# Patient Record
Sex: FEMALE | Race: BLACK OR AFRICAN AMERICAN | NOT HISPANIC OR LATINO | Employment: UNEMPLOYED | URBAN - METROPOLITAN AREA
[De-identification: names, ages, dates, MRNs, and addresses within clinical notes are randomized per-mention and may not be internally consistent; named-entity substitution may affect disease eponyms.]

---

## 2018-09-04 ENCOUNTER — HOSPITAL ENCOUNTER (EMERGENCY)
Facility: HOSPITAL | Age: 53
Discharge: HOME | End: 2018-09-04
Attending: EMERGENCY MEDICINE
Payer: MEDICARE

## 2018-09-04 VITALS
WEIGHT: 160 LBS | RESPIRATION RATE: 18 BRPM | HEIGHT: 64 IN | SYSTOLIC BLOOD PRESSURE: 106 MMHG | HEART RATE: 75 BPM | DIASTOLIC BLOOD PRESSURE: 72 MMHG | TEMPERATURE: 98 F | OXYGEN SATURATION: 99 % | BODY MASS INDEX: 27.31 KG/M2

## 2018-09-04 DIAGNOSIS — M54.2 NECK PAIN: ICD-10-CM

## 2018-09-04 DIAGNOSIS — M43.6 TORTICOLLIS: Primary | ICD-10-CM

## 2018-09-04 PROCEDURE — 99284 EMERGENCY DEPT VISIT MOD MDM: CPT | Mod: 25

## 2018-09-04 PROCEDURE — 25000003 PHARM REV CODE 250: Performed by: EMERGENCY MEDICINE

## 2018-09-04 RX ORDER — CYCLOBENZAPRINE HCL 10 MG
10 TABLET ORAL
Status: COMPLETED | OUTPATIENT
Start: 2018-09-04 | End: 2018-09-04

## 2018-09-04 RX ORDER — IBUPROFEN 600 MG/1
600 TABLET ORAL
Status: COMPLETED | OUTPATIENT
Start: 2018-09-04 | End: 2018-09-04

## 2018-09-04 RX ORDER — NAPROXEN 500 MG/1
500 TABLET ORAL 2 TIMES DAILY WITH MEALS
Qty: 60 TABLET | Refills: 0 | Status: ON HOLD | OUTPATIENT
Start: 2018-09-04 | End: 2024-01-05 | Stop reason: HOSPADM

## 2018-09-04 RX ORDER — CYCLOBENZAPRINE HCL 10 MG
10 TABLET ORAL 3 TIMES DAILY PRN
Qty: 15 TABLET | Refills: 0 | Status: SHIPPED | OUTPATIENT
Start: 2018-09-04 | End: 2018-09-09

## 2018-09-04 RX ADMIN — IBUPROFEN 600 MG: 600 TABLET ORAL at 08:09

## 2018-09-04 RX ADMIN — CYCLOBENZAPRINE HYDROCHLORIDE 10 MG: 10 TABLET, FILM COATED ORAL at 08:09

## 2018-09-05 NOTE — ED PROVIDER NOTES
"Encounter Date: 9/4/2018       History     Chief Complaint   Patient presents with    Neck Pain     Pt reports neck pain x 2 weeks after "sleeping wrong"     Denies trauma, left neck pain x 2-3 weeks, no arm radiation. Mild HA also, NOT worst HA of life.      The history is provided by a parent and the EMS personnel.   Neck Pain    This is a recurrent problem. The current episode started several weeks ago. The problem occurs constantly. The problem has been unchanged. The pain is associated with an unknown factor. The pain is present in the left side. The quality of the pain is described as aching and cramping. The pain does not radiate. The symptoms are aggravated by position. The pain is the same all the time. Associated symptoms include headaches. Pertinent negatives include no numbness, no bowel incontinence, no bladder incontinence, no paresis and no tingling. She has tried NSAIDs (alcohol) for the symptoms. The treatment provided mild relief.     Review of patient's allergies indicates:  No Known Allergies  History reviewed. No pertinent past medical history.  History reviewed. No pertinent surgical history.  History reviewed. No pertinent family history.  Social History     Tobacco Use    Smoking status: Current Every Day Smoker     Packs/day: 0.50     Types: Cigarettes   Substance Use Topics    Alcohol use: Yes     Comment: beer    Drug use: No     Review of Systems   Constitutional: Negative.    HENT: Negative.    Respiratory: Negative.    Cardiovascular: Negative.    Gastrointestinal: Negative for bowel incontinence.   Genitourinary: Negative for bladder incontinence.   Musculoskeletal: Positive for neck pain.   Neurological: Positive for headaches. Negative for dizziness, tingling, seizures, facial asymmetry, speech difficulty and numbness.   All other systems reviewed and are negative.      Physical Exam     Initial Vitals [09/04/18 1918]   BP Pulse Resp Temp SpO2   137/73 81 18 97.9 °F (36.6 °C) 97 " %      MAP       --         Physical Exam    Nursing note and vitals reviewed.  Constitutional: She appears well-developed and well-nourished.   HENT:   Head: Normocephalic.   Eyes: Conjunctivae and EOM are normal. Pupils are equal, round, and reactive to light.   Neck: Trachea normal and phonation normal. Neck supple. Muscular tenderness present. No spinous process tenderness present. Decreased range of motion present. No tracheal deviation and no erythema present. No neck rigidity. No Brudzinski's sign and no Kernig's sign noted.   Neurological: She is alert and oriented to person, place, and time.   Skin: Skin is warm and dry. Capillary refill takes less than 2 seconds.         ED Course   Procedures  Labs Reviewed - No data to display       Imaging Results          X-Ray Cervical Spine AP And Lateral (Final result)  Result time 09/04/18 21:15:59    Final result by Neo Chawla MD (09/04/18 21:15:59)                 Impression:      1. Markedly limited evaluation, particularly involving the upper cervical segments.  No definite malalignment however fracture cannot be excluded given exam quality.  Correlation is advised.      Electronically signed by: Neo Chawla MD  Date:    09/04/2018  Time:    21:15             Narrative:    EXAMINATION:  XR CERVICAL SPINE AP LATERAL    CLINICAL HISTORY:  Cervicalgia    TECHNIQUE:  AP, lateral and open mouth views of the cervical spine were performed.    COMPARISON:  None.    FINDINGS:  Six views.    Please note, positioning is suboptimal, and markedly limits evaluation of the cervical spine.  Patient's arms obscure view of the upper cervical spine, particularly on the lateral views.    Allowing for the above, lateral views demonstrates grossly adequate alignment of the cervical spine.  There is disc space height loss involving the upper cervical segments.  The facet joints appear grossly aligned.  AP alignment appears preserved.  The odontoid is unable to be  appropriately evaluated, however appears intact on AP view..  The upper lung zones are grossly clear.  The paraspinous and hypopharyngeal soft tissues are grossly unremarkable.  The airway is patent.                                 Medical Decision Making:   Initial Assessment:   Non-traumatic left neck pain, xray c-spine, flexeril, motrin, reassess                   ED Course as of Sep 04 2317   Tue Sep 04, 2018   2058 Stable in NAD, xrays neg for fx, muscle spams, d/c home flexeril and naprosyn  [PP]      ED Course User Index  [PP] Jerel Bell MD     Clinical Impression:   The primary encounter diagnosis was Torticollis. A diagnosis of Neck pain was also pertinent to this visit.                             Jerel Bell MD  09/04/18 0753

## 2018-09-05 NOTE — ED TRIAGE NOTES
"Left sided neck pain since Saturday. Pt states "I can't seep or nothing." pt AAOX 3 at this lopez.e pt holding neck. Pt in no distress.  "

## 2024-01-03 ENCOUNTER — HOSPITAL ENCOUNTER (INPATIENT)
Facility: HOSPITAL | Age: 59
LOS: 1 days | Discharge: HOME | DRG: 641 | End: 2024-01-05
Attending: EMERGENCY MEDICINE | Admitting: INTERNAL MEDICINE
Payer: MEDICARE

## 2024-01-03 DIAGNOSIS — R00.0 TACHYCARDIA: ICD-10-CM

## 2024-01-03 DIAGNOSIS — R07.9 CHEST PAIN: ICD-10-CM

## 2024-01-03 DIAGNOSIS — E86.0 DEHYDRATION: ICD-10-CM

## 2024-01-03 DIAGNOSIS — R19.7 DIARRHEA, UNSPECIFIED TYPE: ICD-10-CM

## 2024-01-03 DIAGNOSIS — J11.1 INFLUENZA: Primary | ICD-10-CM

## 2024-01-03 DIAGNOSIS — B20 HIV DISEASE: ICD-10-CM

## 2024-01-03 DIAGNOSIS — E83.42 HYPOMAGNESEMIA: ICD-10-CM

## 2024-01-03 DIAGNOSIS — E83.51 HYPOCALCEMIA: ICD-10-CM

## 2024-01-03 DIAGNOSIS — E87.6 HYPOKALEMIA: ICD-10-CM

## 2024-01-03 LAB
ALBUMIN SERPL BCP-MCNC: 1.6 G/DL (ref 3.5–5.2)
ALP SERPL-CCNC: 98 U/L (ref 55–135)
ALT SERPL W/O P-5'-P-CCNC: 48 U/L (ref 10–44)
ANION GAP SERPL CALC-SCNC: 6 MMOL/L (ref 8–16)
AST SERPL-CCNC: 47 U/L (ref 10–40)
BASOPHILS # BLD AUTO: 0.02 K/UL (ref 0–0.2)
BASOPHILS NFR BLD: 0.3 % (ref 0–1.9)
BILIRUB SERPL-MCNC: 0.5 MG/DL (ref 0.1–1)
BUN SERPL-MCNC: 3 MG/DL (ref 6–20)
CALCIUM SERPL-MCNC: 6 MG/DL (ref 8.7–10.5)
CHLORIDE SERPL-SCNC: 111 MMOL/L (ref 95–110)
CO2 SERPL-SCNC: 18 MMOL/L (ref 23–29)
CREAT SERPL-MCNC: 0.5 MG/DL (ref 0.5–1.4)
DIFFERENTIAL METHOD BLD: ABNORMAL
EOSINOPHIL # BLD AUTO: 0 K/UL (ref 0–0.5)
EOSINOPHIL NFR BLD: 0 % (ref 0–8)
ERYTHROCYTE [DISTWIDTH] IN BLOOD BY AUTOMATED COUNT: 13.4 % (ref 11.5–14.5)
EST. GFR  (NO RACE VARIABLE): >60 ML/MIN/1.73 M^2
GLUCOSE SERPL-MCNC: 75 MG/DL (ref 70–110)
HCT VFR BLD AUTO: 30 % (ref 37–48.5)
HGB BLD-MCNC: 10.2 G/DL (ref 12–16)
IMM GRANULOCYTES # BLD AUTO: 0.06 K/UL (ref 0–0.04)
IMM GRANULOCYTES NFR BLD AUTO: 1 % (ref 0–0.5)
LACTATE SERPL-SCNC: 2 MMOL/L (ref 0.5–2.2)
LYMPHOCYTES # BLD AUTO: 1.5 K/UL (ref 1–4.8)
LYMPHOCYTES NFR BLD: 23.8 % (ref 18–48)
MAGNESIUM SERPL-MCNC: 1 MG/DL (ref 1.6–2.6)
MCH RBC QN AUTO: 28.4 PG (ref 27–31)
MCHC RBC AUTO-ENTMCNC: 34 G/DL (ref 32–36)
MCV RBC AUTO: 84 FL (ref 82–98)
MONOCYTES # BLD AUTO: 0.3 K/UL (ref 0.3–1)
MONOCYTES NFR BLD: 5 % (ref 4–15)
NEUTROPHILS # BLD AUTO: 4.4 K/UL (ref 1.8–7.7)
NEUTROPHILS NFR BLD: 69.9 % (ref 38–73)
NRBC BLD-RTO: 0 /100 WBC
PHOSPHATE SERPL-MCNC: 2.2 MG/DL (ref 2.7–4.5)
PLATELET # BLD AUTO: 156 K/UL (ref 150–450)
PMV BLD AUTO: 10.1 FL (ref 9.2–12.9)
POTASSIUM SERPL-SCNC: 2.4 MMOL/L (ref 3.5–5.1)
PROT SERPL-MCNC: 5.3 G/DL (ref 6–8.4)
RBC # BLD AUTO: 3.59 M/UL (ref 4–5.4)
SODIUM SERPL-SCNC: 135 MMOL/L (ref 136–145)
TSH SERPL DL<=0.005 MIU/L-ACNC: 0.82 UIU/ML (ref 0.4–4)
WBC # BLD AUTO: 6.22 K/UL (ref 3.9–12.7)

## 2024-01-03 PROCEDURE — 84100 ASSAY OF PHOSPHORUS: CPT | Performed by: EMERGENCY MEDICINE

## 2024-01-03 PROCEDURE — 93005 ELECTROCARDIOGRAM TRACING: CPT

## 2024-01-03 PROCEDURE — 99291 CRITICAL CARE FIRST HOUR: CPT

## 2024-01-03 PROCEDURE — 80053 COMPREHEN METABOLIC PANEL: CPT | Performed by: EMERGENCY MEDICINE

## 2024-01-03 PROCEDURE — 83735 ASSAY OF MAGNESIUM: CPT | Performed by: EMERGENCY MEDICINE

## 2024-01-03 PROCEDURE — 85025 COMPLETE CBC W/AUTO DIFF WBC: CPT | Performed by: EMERGENCY MEDICINE

## 2024-01-03 PROCEDURE — 96366 THER/PROPH/DIAG IV INF ADDON: CPT

## 2024-01-03 PROCEDURE — 25000003 PHARM REV CODE 250: Performed by: EMERGENCY MEDICINE

## 2024-01-03 PROCEDURE — 84443 ASSAY THYROID STIM HORMONE: CPT | Performed by: EMERGENCY MEDICINE

## 2024-01-03 PROCEDURE — 96361 HYDRATE IV INFUSION ADD-ON: CPT

## 2024-01-03 PROCEDURE — 96375 TX/PRO/DX INJ NEW DRUG ADDON: CPT

## 2024-01-03 PROCEDURE — 96365 THER/PROPH/DIAG IV INF INIT: CPT | Mod: 59

## 2024-01-03 PROCEDURE — 84145 PROCALCITONIN (PCT): CPT | Performed by: EMERGENCY MEDICINE

## 2024-01-03 PROCEDURE — 63600175 PHARM REV CODE 636 W HCPCS: Performed by: EMERGENCY MEDICINE

## 2024-01-03 PROCEDURE — 83605 ASSAY OF LACTIC ACID: CPT | Performed by: EMERGENCY MEDICINE

## 2024-01-03 PROCEDURE — 93010 ELECTROCARDIOGRAM REPORT: CPT | Mod: ,,, | Performed by: INTERNAL MEDICINE

## 2024-01-03 RX ORDER — MAGNESIUM SULFATE HEPTAHYDRATE 40 MG/ML
2 INJECTION, SOLUTION INTRAVENOUS ONCE
Status: COMPLETED | OUTPATIENT
Start: 2024-01-03 | End: 2024-01-04

## 2024-01-03 RX ORDER — ACETAMINOPHEN 500 MG
1000 TABLET ORAL
Status: COMPLETED | OUTPATIENT
Start: 2024-01-03 | End: 2024-01-03

## 2024-01-03 RX ORDER — CALCIUM GLUCONATE 98 MG/ML
1 INJECTION, SOLUTION INTRAVENOUS
Status: COMPLETED | OUTPATIENT
Start: 2024-01-03 | End: 2024-01-03

## 2024-01-03 RX ADMIN — MAGNESIUM SULFATE HEPTAHYDRATE 2 G: 40 INJECTION, SOLUTION INTRAVENOUS at 11:01

## 2024-01-03 RX ADMIN — SODIUM CHLORIDE, POTASSIUM CHLORIDE, SODIUM LACTATE AND CALCIUM CHLORIDE 1000 ML: 600; 310; 30; 20 INJECTION, SOLUTION INTRAVENOUS at 10:01

## 2024-01-03 RX ADMIN — ACETAMINOPHEN 1000 MG: 500 TABLET ORAL at 10:01

## 2024-01-03 RX ADMIN — CALCIUM GLUCONATE 1 G: 98 INJECTION, SOLUTION INTRAVENOUS at 11:01

## 2024-01-03 RX ADMIN — POTASSIUM BICARBONATE 50 MEQ: 977.5 TABLET, EFFERVESCENT ORAL at 11:01

## 2024-01-04 PROBLEM — E83.42 HYPOMAGNESEMIA: Status: ACTIVE | Noted: 2024-01-04

## 2024-01-04 PROBLEM — B20 HIV DISEASE: Status: ACTIVE | Noted: 2023-12-28

## 2024-01-04 PROBLEM — E87.6 HYPOKALEMIA: Status: ACTIVE | Noted: 2024-01-04

## 2024-01-04 PROBLEM — E83.51 HYPOCALCEMIA: Status: ACTIVE | Noted: 2024-01-04

## 2024-01-04 PROBLEM — J11.1 INFLUENZA: Status: ACTIVE | Noted: 2024-01-03

## 2024-01-04 LAB
ALBUMIN SERPL BCP-MCNC: 2.2 G/DL (ref 3.5–5.2)
ALP SERPL-CCNC: 138 U/L (ref 55–135)
ALT SERPL W/O P-5'-P-CCNC: 57 U/L (ref 10–44)
ANION GAP SERPL CALC-SCNC: 7 MMOL/L (ref 8–16)
AST SERPL-CCNC: 59 U/L (ref 10–40)
BILIRUB SERPL-MCNC: 0.6 MG/DL (ref 0.1–1)
BILIRUB UR QL STRIP: NEGATIVE
BUN SERPL-MCNC: 4 MG/DL (ref 6–20)
CALCIUM SERPL-MCNC: 8.7 MG/DL (ref 8.7–10.5)
CHLORIDE SERPL-SCNC: 99 MMOL/L (ref 95–110)
CLARITY UR: CLEAR
CO2 SERPL-SCNC: 26 MMOL/L (ref 23–29)
COLOR UR: YELLOW
CREAT SERPL-MCNC: 0.6 MG/DL (ref 0.5–1.4)
EST. GFR  (NO RACE VARIABLE): >60 ML/MIN/1.73 M^2
GLUCOSE SERPL-MCNC: 80 MG/DL (ref 70–110)
GLUCOSE UR QL STRIP: NEGATIVE
HGB UR QL STRIP: NEGATIVE
KETONES UR QL STRIP: NEGATIVE
LEUKOCYTE ESTERASE UR QL STRIP: NEGATIVE
MAGNESIUM SERPL-MCNC: 1.9 MG/DL (ref 1.6–2.6)
NITRITE UR QL STRIP: NEGATIVE
PH UR STRIP: 7 [PH] (ref 5–8)
POTASSIUM SERPL-SCNC: 3.7 MMOL/L (ref 3.5–5.1)
PROCALCITONIN SERPL IA-MCNC: 1.29 NG/ML
PROT SERPL-MCNC: 7.4 G/DL (ref 6–8.4)
PROT UR QL STRIP: NEGATIVE
SODIUM SERPL-SCNC: 132 MMOL/L (ref 136–145)
SP GR UR STRIP: 1.01 (ref 1–1.03)
URN SPEC COLLECT METH UR: NORMAL
UROBILINOGEN UR STRIP-ACNC: NEGATIVE EU/DL

## 2024-01-04 PROCEDURE — 36415 COLL VENOUS BLD VENIPUNCTURE: CPT | Performed by: STUDENT IN AN ORGANIZED HEALTH CARE EDUCATION/TRAINING PROGRAM

## 2024-01-04 PROCEDURE — 25000003 PHARM REV CODE 250: Performed by: EMERGENCY MEDICINE

## 2024-01-04 PROCEDURE — 21400001 HC TELEMETRY ROOM

## 2024-01-04 PROCEDURE — 27000207 HC ISOLATION

## 2024-01-04 PROCEDURE — 81003 URINALYSIS AUTO W/O SCOPE: CPT | Performed by: EMERGENCY MEDICINE

## 2024-01-04 PROCEDURE — 86361 T CELL ABSOLUTE COUNT: CPT | Performed by: EMERGENCY MEDICINE

## 2024-01-04 PROCEDURE — 80053 COMPREHEN METABOLIC PANEL: CPT | Performed by: STUDENT IN AN ORGANIZED HEALTH CARE EDUCATION/TRAINING PROGRAM

## 2024-01-04 PROCEDURE — 63600175 PHARM REV CODE 636 W HCPCS: Performed by: STUDENT IN AN ORGANIZED HEALTH CARE EDUCATION/TRAINING PROGRAM

## 2024-01-04 PROCEDURE — 25000003 PHARM REV CODE 250: Performed by: STUDENT IN AN ORGANIZED HEALTH CARE EDUCATION/TRAINING PROGRAM

## 2024-01-04 PROCEDURE — 63600175 PHARM REV CODE 636 W HCPCS: Performed by: EMERGENCY MEDICINE

## 2024-01-04 PROCEDURE — 83735 ASSAY OF MAGNESIUM: CPT | Performed by: STUDENT IN AN ORGANIZED HEALTH CARE EDUCATION/TRAINING PROGRAM

## 2024-01-04 RX ORDER — OSELTAMIVIR PHOSPHATE 75 MG/1
75 CAPSULE ORAL 2 TIMES DAILY
Status: DISCONTINUED | OUTPATIENT
Start: 2024-01-04 | End: 2024-01-05 | Stop reason: HOSPADM

## 2024-01-04 RX ORDER — DOXYCYCLINE HYCLATE 100 MG/1
100 TABLET, DELAYED RELEASE ORAL DAILY
COMMUNITY

## 2024-01-04 RX ORDER — POTASSIUM CHLORIDE 14.9 MG/ML
20 INJECTION INTRAVENOUS
Status: DISCONTINUED | OUTPATIENT
Start: 2024-01-04 | End: 2024-01-04

## 2024-01-04 RX ORDER — SODIUM CHLORIDE AND POTASSIUM CHLORIDE 150; 900 MG/100ML; MG/100ML
INJECTION, SOLUTION INTRAVENOUS CONTINUOUS
Status: DISCONTINUED | OUTPATIENT
Start: 2024-01-04 | End: 2024-01-05 | Stop reason: HOSPADM

## 2024-01-04 RX ORDER — ACETAMINOPHEN 325 MG/1
650 TABLET ORAL EVERY 4 HOURS PRN
Status: DISCONTINUED | OUTPATIENT
Start: 2024-01-04 | End: 2024-01-05 | Stop reason: HOSPADM

## 2024-01-04 RX ORDER — OSELTAMIVIR PHOSPHATE 75 MG/1
75 CAPSULE ORAL
Status: COMPLETED | OUTPATIENT
Start: 2024-01-04 | End: 2024-01-04

## 2024-01-04 RX ORDER — SODIUM CHLORIDE 0.9 % (FLUSH) 0.9 %
10 SYRINGE (ML) INJECTION EVERY 12 HOURS PRN
Status: DISCONTINUED | OUTPATIENT
Start: 2024-01-04 | End: 2024-01-05 | Stop reason: HOSPADM

## 2024-01-04 RX ORDER — GLUCAGON 1 MG
1 KIT INJECTION
Status: DISCONTINUED | OUTPATIENT
Start: 2024-01-04 | End: 2024-01-05 | Stop reason: HOSPADM

## 2024-01-04 RX ORDER — NALOXONE HCL 0.4 MG/ML
0.02 VIAL (ML) INJECTION
Status: DISCONTINUED | OUTPATIENT
Start: 2024-01-04 | End: 2024-01-05 | Stop reason: HOSPADM

## 2024-01-04 RX ORDER — POTASSIUM CHLORIDE 7.45 MG/ML
10 INJECTION INTRAVENOUS
Status: COMPLETED | OUTPATIENT
Start: 2024-01-04 | End: 2024-01-04

## 2024-01-04 RX ORDER — IBUPROFEN 200 MG
16 TABLET ORAL
Status: DISCONTINUED | OUTPATIENT
Start: 2024-01-04 | End: 2024-01-05 | Stop reason: HOSPADM

## 2024-01-04 RX ORDER — IBUPROFEN 200 MG
24 TABLET ORAL
Status: DISCONTINUED | OUTPATIENT
Start: 2024-01-04 | End: 2024-01-05 | Stop reason: HOSPADM

## 2024-01-04 RX ORDER — ERGOCALCIFEROL 1.25 MG/1
1 CAPSULE ORAL
COMMUNITY
Start: 2023-12-27

## 2024-01-04 RX ADMIN — OSELTAMIVIR PHOSPHATE 75 MG: 75 CAPSULE ORAL at 08:01

## 2024-01-04 RX ADMIN — BICTEGRAVIR SODIUM, EMTRICITABINE, AND TENOFOVIR ALAFENAMIDE FUMARATE 1 TABLET: 50; 200; 25 TABLET ORAL at 08:01

## 2024-01-04 RX ADMIN — POTASSIUM CHLORIDE AND SODIUM CHLORIDE: 900; 150 INJECTION, SOLUTION INTRAVENOUS at 08:01

## 2024-01-04 RX ADMIN — OSELTAMIVIR PHOSPHATE 75 MG: 75 CAPSULE ORAL at 01:01

## 2024-01-04 RX ADMIN — POTASSIUM CHLORIDE 10 MEQ: 7.46 INJECTION, SOLUTION INTRAVENOUS at 01:01

## 2024-01-04 NOTE — PLAN OF CARE
Case Management Assessment     PCP: [facility]   Pharmacy: [facility] [place]     Patient Arrived From: Home   Existing Help at Home: Margot Smith- daughter.     Barriers to Discharge: none    Discharge Plan:    A. Home   B. Home with family     Patient from home and lives with daughter who will be her help at home. No current home services or dme. Pt stated her son will provide transportation home.      01/04/24 1557   Discharge Assessment   Assessment Type Discharge Planning Assessment   Confirmed/corrected address, phone number and insurance Yes   Confirmed Demographics Correct on Facesheet   Source of Information patient;health record   Communicated TABBY with patient/caregiver Date not available/Unable to determine   People in Home child(danielle), adult   Do you expect to return to your current living situation? Yes   Do you have help at home or someone to help you manage your care at home? Yes   Prior to hospitilization cognitive status: Alert/Oriented   Current cognitive status: Alert/Oriented   Equipment Currently Used at Home none   Patient currently being followed by outpatient case management? No   Do you currently have service(s) that help you manage your care at home? No   Do you take prescription medications? Yes   Do you have prescription coverage? Yes   Do you have any problems affording any of your prescribed medications? TBD   Is the patient taking medications as prescribed? yes   How do you get to doctors appointments? family or friend will provide   Are you on dialysis? No   Do you take coumadin? No   Discharge Plan A Home with family   Discharge Plan B Home   DME Needed Upon Discharge  none   Discharge Plan discussed with: Patient   Transition of Care Barriers None

## 2024-01-04 NOTE — HPI
Ms. Smith is a 59 yo F with hx of HIV on biktarvy who presents to the ED for evaluation of fatigue, lethargy and fever. She reportedly woke up not feeling well and went to her PCP where she was diagnosed with influenza and started on tamiflu. She continued to feel worse with fevers and presented to the ED.   In the ED, she was tachycardic to 120 and febrile to 103.5 saturating 100% on room air. Lab work remarkable for K 2.4, Ca 6.0 and Mg 1.0. She was given tylenol, magnesium, potassium and calcium. Admitted to hospital medicine for further management.

## 2024-01-04 NOTE — SUBJECTIVE & OBJECTIVE
No past medical history on file.    No past surgical history on file.    Review of patient's allergies indicates:  No Known Allergies    No current facility-administered medications on file prior to encounter.     Current Outpatient Medications on File Prior to Encounter   Medication Sig    bictegrav/emtricit/tenofov ala (BIKTARVY ORAL) Take by mouth.    doxycycline (DORYX) 100 MG EC tablet Take 100 mg by mouth once daily.    ergocalciferol (ERGOCALCIFEROL) 50,000 unit Cap Take 1 capsule by mouth every 7 days.    naproxen (NAPROSYN) 500 MG tablet Take 1 tablet (500 mg total) by mouth 2 (two) times daily with meals.     Family History    None       Tobacco Use    Smoking status: Every Day     Current packs/day: 0.50     Types: Cigarettes    Smokeless tobacco: Not on file   Substance and Sexual Activity    Alcohol use: Yes     Comment: beer    Drug use: No    Sexual activity: Not on file     Review of Systems  Objective:     Vital Signs (Most Recent):  Temp: 99.1 °F (37.3 °C) (01/04/24 0727)  Pulse: 87 (01/04/24 0727)  Resp: 19 (01/04/24 0727)  BP: 110/69 (01/04/24 0727)  SpO2: (!) 94 % (01/04/24 0727) Vital Signs (24h Range):  Temp:  [98.5 °F (36.9 °C)-103.5 °F (39.7 °C)] 99.1 °F (37.3 °C)  Pulse:  [] 87  Resp:  [16-27] 19  SpO2:  [92 %-100 %] 94 %  BP: (107-145)/(60-85) 110/69     Weight: 56 kg (123 lb 7.3 oz)  Body mass index is 21.19 kg/m².     Physical Exam  Vitals and nursing note reviewed.   Constitutional:       General: She is not in acute distress.     Appearance: She is ill-appearing.   Cardiovascular:      Rate and Rhythm: Normal rate and regular rhythm.      Pulses: Normal pulses.      Heart sounds: No murmur heard.  Pulmonary:      Effort: Pulmonary effort is normal. No respiratory distress.   Musculoskeletal:         General: No swelling.   Skin:     General: Skin is warm.   Neurological:      General: No focal deficit present.      Mental Status: She is alert and oriented to person, place, and  time.                Significant Labs: All pertinent labs within the past 24 hours have been reviewed.    Significant Imaging: I have reviewed all pertinent imaging results/findings within the past 24 hours.

## 2024-01-04 NOTE — H&P
[facility] Medicine  History & Physical    Patient Name: Yris Smith  MRN: [id number]  Patient Class: IP- Inpatient  Admission Date: 1/3/2024  Attending Physician: Jorge Ruffin MD   Primary Care Provider: Unable, To Obtain         Patient information was obtained from patient, past medical records, and ER records.     Subjective:     Principal Problem:Hypokalemia    Chief Complaint:   Chief Complaint   Patient presents with    Fever    Fatigue     Patient arrives via EMS with malaise and fever since this morning. She was seen at primary care earlier and diagnosed with flu, given meds. She has not felt any better and has a fever now. 103.5        HPI: Ms. Smith is a 59 yo F with hx of HIV on biktarvy who presents to the ED for evaluation of fatigue, lethargy and fever. She reportedly woke up not feeling well and went to her PCP where she was diagnosed with influenza and started on tamiflu. She continued to feel worse with fevers and presented to the ED.   In the ED, she was tachycardic to 120 and febrile to 103.5 saturating 100% on room air. Lab work remarkable for K 2.4, Ca 6.0 and Mg 1.0. She was given tylenol, magnesium, potassium and calcium. Admitted to hospital medicine for further management.    No past medical history on file.    No past surgical history on file.    Review of patient's allergies indicates:  No Known Allergies    No current facility-administered medications on file prior to encounter.     Current Outpatient Medications on File Prior to Encounter   Medication Sig    bictegrav/emtricit/tenofov ala (BIKTARVY ORAL) Take by mouth.    doxycycline (DORYX) 100 MG EC tablet Take 100 mg by mouth once daily.    ergocalciferol (ERGOCALCIFEROL) 50,000 unit Cap Take 1 capsule by mouth every 7 days.    naproxen (NAPROSYN) 500 MG tablet Take 1 tablet (500 mg total) by mouth 2 (two) times daily with meals.     Family History    None       Tobacco Use    Smoking status: Every Day      Current packs/day: 0.50     Types: Cigarettes    Smokeless tobacco: Not on file   Substance and Sexual Activity    Alcohol use: Yes     Comment: beer    Drug use: No    Sexual activity: Not on file     Review of Systems  Objective:     Vital Signs (Most Recent):  Temp: 99.1 °F (37.3 °C) (01/04/24 0727)  Pulse: 87 (01/04/24 0727)  Resp: 19 (01/04/24 0727)  BP: 110/69 (01/04/24 0727)  SpO2: (!) 94 % (01/04/24 0727) Vital Signs (24h Range):  Temp:  [98.5 °F (36.9 °C)-103.5 °F (39.7 °C)] 99.1 °F (37.3 °C)  Pulse:  [] 87  Resp:  [16-27] 19  SpO2:  [92 %-100 %] 94 %  BP: (107-145)/(60-85) 110/69     Weight: 56 kg (123 lb 7.3 oz)  Body mass index is 21.19 kg/m².     Physical Exam  Vitals and nursing note reviewed.   Constitutional:       General: She is not in acute distress.     Appearance: She is ill-appearing.   Cardiovascular:      Rate and Rhythm: Normal rate and regular rhythm.      Pulses: Normal pulses.      Heart sounds: No murmur heard.  Pulmonary:      Effort: Pulmonary effort is normal. No respiratory distress.   Musculoskeletal:         General: No swelling.   Skin:     General: Skin is warm.   Neurological:      General: No focal deficit present.      Mental Status: She is alert and oriented to person, place, and time.                Significant Labs: All pertinent labs within the past 24 hours have been reviewed.    Significant Imaging: I have reviewed all pertinent imaging results/findings within the past 24 hours.  Assessment/Plan:     * Hypokalemia  Patient has hypokalemia which is Acute on Chronic and currently uncontrolled. Most recent potassium levels reviewed-   Lab Results   Component Value Date    K 2.4 (LL) 01/03/2024   . Will continue potassium replacement per protocol and recheck repeat levels after replacement completed.     Hypomagnesemia  Patient has Abnormal Magnesium: hypomagnesemia. Will continue to monitor electrolytes closely. Will replace the affected electrolytes and repeat labs  "to be done after interventions completed. The patient's magnesium results have been reviewed and are listed below.  Recent Labs   Lab 01/03/24  2208   MG 1.0*        Hypocalcemia  Patient has hypocalcemia due to  Vit D deficiency  which is currently uncontrolled, and has been confirmed with ionized and/or corrected calcium. Will replace calcium and monitor electrolytes closely. The latest calcium labs have been reviewed and are listed below.  Recent Labs   Lab 01/03/24  2208   CALCIUM 6.0*       No results for input(s): "CAION" in the last 24 hours.        Influenza  - Diagnosed on 1/4/24 by her PCP  - supportive care  - start on tamiflu       HIV disease  -on Biktarvy, reports compliance  - resume        VTE Risk Mitigation (From admission, onward)           Ordered     IP VTE LOW RISK PATIENT  Once         01/04/24 0650     Place sequential compression device  Until discontinued         01/04/24 0650                               AdmissionCare    Guideline: Dehydration - INPT, Inpatient    Based on the indications selected for the patient, the bed status of Admit to Inpatient was determined to be MET    The following indications were selected as present at the time of evaluation of the patient:      Dehydration that is severe or persistent, as indicated by 1 or more of the following:   -     Dehydration that is persistent, as indicated by ALL of the following:    -      - Oral rehydration therapy not tolerated or insufficient to adequately correct dehydration     - Appropriate intravenous treatment (eg, fluids) does not readily correct dehydration.    - Electrolyte abnormality is not at acceptable patient baseline (eg, treatment effect).    AdmissionCare documentation entered by: RENAN Olea    [facility], 27th edition, Copyright © 2023 [facility] [employer] [facility] All Rights Reserved.  [id number]:00    Jorge Ruffin MD  Department of Hospital Medicine  [facility] [place] - Telemetry          "

## 2024-01-04 NOTE — NURSING
[facility] [place] 4 [place]      1/4/2024       Skin assessed on: Q Shift      [x] No Pressure Injuries Present    [x]Prevention Measures Documented    [] Yes LDA  for Pressure Injury Previously documented     [] Yes New Pressure Injury Discovered   [] LDA for New Pressure Injury Added      Attending RN:  Janee Nguyen RN     Second RN:  Chloe Valdivia RN

## 2024-01-04 NOTE — ASSESSMENT & PLAN NOTE
"Patient has hypocalcemia due to  Vit D deficiency  which is currently uncontrolled, and has been confirmed with ionized and/or corrected calcium. Will replace calcium and monitor electrolytes closely. The latest calcium labs have been reviewed and are listed below.  Recent Labs   Lab 01/03/24  0915   CALCIUM 6.0*       No results for input(s): "CAION" in the last 24 hours.      "

## 2024-01-04 NOTE — NURSING
[facility], [facility]  Nurses Note -- 4 Eyes      1/4/2024       Skin assessed on: Admit      [x] No Pressure Injuries Present    [x]Prevention Measures Documented    [] Yes LDA  for Pressure Injury Previously documented     [] Yes New Pressure Injury Discovered   [] LDA for New Pressure Injury Added      Attending RN:  Chloe Valdivia RN     Second RN:  Sheryl REVELES RN

## 2024-01-04 NOTE — ED TRIAGE NOTES
Pt presents to ED with complaint of generalized weakness and AMS according to family. Family states that she was seen at pcp earlier today for flu and since then she has been a bit altered. Pt was not able to answer questions with family. Pt is alert and oriented x4. Pt denies any complaints at the moment.

## 2024-01-04 NOTE — ASSESSMENT & PLAN NOTE
Patient has hypokalemia which is Acute on Chronic and currently uncontrolled. Most recent potassium levels reviewed-   Lab Results   Component Value Date    K 2.4 (LL) 01/03/2024   . Will continue potassium replacement per protocol and recheck repeat levels after replacement completed.

## 2024-01-04 NOTE — ASSESSMENT & PLAN NOTE
Patient has Abnormal Magnesium: hypomagnesemia. Will continue to monitor electrolytes closely. Will replace the affected electrolytes and repeat labs to be done after interventions completed. The patient's magnesium results have been reviewed and are listed below.  Recent Labs   Lab 01/03/24  2208   MG 1.0*

## 2024-01-04 NOTE — ED PROVIDER NOTES
Encounter Date: 1/3/2024       History     Chief Complaint   Patient presents with    Fever    Fatigue     Patient arrives via EMS with malaise and fever since this morning. She was seen at primary care earlier and diagnosed with flu, given meds. She has not felt any better and has a fever now. 103.5     Pt is a 57 y/o F with PMHx including HIV (10/2023 CD4 500/24%, Vl <50, on Biktarvy) and recently diagnosed with influenza following a visit with her PCP earlier today. She has been experiencing two days of Sx's including fever, body aches, chills and fatigue. Persistent diarrhea has also been an issue as well.         Review of patient's allergies indicates:  No Known Allergies  No past medical history on file.  No past surgical history on file.  No family history on file.  Social History     Tobacco Use    Smoking status: Every Day     Current packs/day: 0.50     Types: Cigarettes   Substance Use Topics    Alcohol use: Yes     Comment: beer    Drug use: No     Review of Systems   Constitutional:  Positive for chills, fatigue and fever.   HENT:  Negative for congestion, rhinorrhea, sinus pressure and sore throat.    Eyes:  Negative for discharge and redness.   Respiratory:  Negative for cough and shortness of breath.    Cardiovascular:  Negative for chest pain.   Gastrointestinal:  Positive for diarrhea. Negative for abdominal pain, blood in stool, constipation, nausea and vomiting.   Genitourinary:  Negative for dysuria, hematuria, pelvic pain, vaginal bleeding and vaginal discharge.   Musculoskeletal:  Positive for myalgias. Negative for arthralgias, back pain, neck pain and neck stiffness.   Skin:  Negative for rash and wound.   Allergic/Immunologic: Positive for immunocompromised state (HIV).   Neurological:  Negative for weakness and headaches.   Hematological:  Does not bruise/bleed easily.   Psychiatric/Behavioral:  Negative for confusion. The patient is not nervous/anxious.        Physical Exam     Initial  Vitals [01/03/24 2126]   BP Pulse Resp Temp SpO2   (!) 144/85 (!) 120 18 (!) 103.5 °F (39.7 °C) 100 %      MAP       --         Physical Exam    Nursing note and vitals reviewed.  Constitutional: She appears well-developed and well-nourished. She is not diaphoretic. No distress.   HENT:   Head: Normocephalic and atraumatic.   Nose: Nose normal.   Mouth/Throat: Oropharynx is clear and moist.   Eyes: Conjunctivae are normal. Right eye exhibits no discharge. Left eye exhibits no discharge. No scleral icterus.   Neck: No tracheal deviation present. No JVD present.   Cardiovascular:  Regular rhythm, normal heart sounds and intact distal pulses.     Exam reveals no gallop and no friction rub.       No murmur heard.   HR of approximately 120 BPM   Pulmonary/Chest: Breath sounds normal. No stridor. No respiratory distress. She has no wheezes. She has no rhonchi. She has no rales. She exhibits no tenderness.   Abdominal: Abdomen is soft. She exhibits no distension and no mass. There is no abdominal tenderness. There is no rebound and no guarding.   Musculoskeletal:         General: No tenderness or edema. Normal range of motion.     Neurological: She is alert and oriented to person, place, and time. She has normal strength. GCS score is 15. GCS eye subscore is 4. GCS verbal subscore is 5. GCS motor subscore is 6.   ROSE with 5/5 strength. Pt displays no facial asymmetry or any signs of gross neuro deficits.       Skin: Skin is warm and dry. Capillary refill takes less than 2 seconds. No rash and no abscess noted. No erythema. No pallor.   Psychiatric: She has a normal mood and affect. Her behavior is normal. Judgment and thought content normal.         ED Course   Critical Care    Date/Time: 1/4/2024 8:41 AM    Performed by: Brayden Adame MD  Authorized by: Brayden Adame MD  Direct patient critical care time: 10 minutes  Additional history critical care time: 10 minutes  Ordering / reviewing critical care time: 10  minutes  Documentation critical care time: 10 minutes  Consulting other physicians critical care time: 5 minutes  Consult with family critical care time: 0 minutes  Other critical care time: 0 minutes  Total critical care time (exclusive of procedural time) : 45 minutes  Critical care time was exclusive of teaching time and separately billable procedures and treating other patients.  Critical care was necessary to treat or prevent imminent or life-threatening deterioration of the following conditions: diarrhea resulting in hypokalema, hypomagnesemia and hypocalcemia.  Critical care was time spent personally by me on the following activities: discussions with consultants, development of treatment plan with patient or surrogate, evaluation of patient's response to treatment, interpretation of cardiac output measurements, examination of patient, obtaining history from patient or surrogate, ordering and performing treatments and interventions, ordering and review of laboratory studies, ordering and review of radiographic studies, pulse oximetry, re-evaluation of patient's condition and review of old charts.        Labs Reviewed   CBC W/ AUTO DIFFERENTIAL - Abnormal; Notable for the following components:       Result Value    RBC 3.59 (*)     Hemoglobin 10.2 (*)     Hematocrit 30.0 (*)     Immature Granulocytes 1.0 (*)     Immature Grans (Abs) 0.06 (*)     All other components within normal limits   COMPREHENSIVE METABOLIC PANEL - Abnormal; Notable for the following components:    Sodium 135 (*)     Potassium 2.4 (*)     Chloride 111 (*)     CO2 18 (*)     BUN 3 (*)     Calcium 6.0 (*)     Total Protein 5.3 (*)     Albumin 1.6 (*)     AST 47 (*)     ALT 48 (*)     Anion Gap 6 (*)     All other components within normal limits    Narrative:     Potassium and calcium critical result(s) called and verbal readback   obtained from Shannan ROSARIO  by LN2 01/03/2024 22:47   MAGNESIUM - Abnormal; Notable for the following  components:    Magnesium 1.0 (*)     All other components within normal limits   PHOSPHORUS - Abnormal; Notable for the following components:    Phosphorus 2.2 (*)     All other components within normal limits   URINALYSIS, REFLEX TO URINE CULTURE    Narrative:     Specimen Source->Urine   TSH   LACTIC ACID, PLASMA   PHOSPHORUS   PROCALCITONIN   T-HELPER CELLS (CD4) COUNT     EKG Readings: (Independently Interpreted)   Initial Reading: No STEMI. Rhythm: Sinus Tachycardia. Heart Rate: 118. Ectopy: No Ectopy. Conduction: Normal. ST Segments: Normal ST Segments. T Waves Flipped: AVR. Axis: Normal. Clinical Impression: Sinus Tachycardia       Imaging Results              X-Ray Chest AP Portable (Final result)  Result time 01/03/24 22:44:08      Final result by Maria Luisa Bennett MD (01/03/24 22:44:08)                   Impression:      No acute intrathoracic abnormality detected.      Electronically signed by: Maria Luisa Bennett  Date:    01/03/2024  Time:    22:44               Narrative:    EXAMINATION:  AP PORTABLE CHEST    CLINICAL HISTORY:  fever;    TECHNIQUE:  AP portable chest radiograph was submitted.    COMPARISON:  02/28/2012    FINDINGS:  AP portable chest radiograph demonstrates a cardiac silhouette within normal limits.  Vascular calcification is seen at the aortic knob.  There is no focal consolidation, pneumothorax, or pleural effusion.                                       Medications   sodium chloride 0.9% flush 10 mL (has no administration in time range)   naloxone 0.4 mg/mL injection 0.02 mg (has no administration in time range)   glucose chewable tablet 16 g (has no administration in time range)   glucose chewable tablet 24 g (has no administration in time range)   glucagon (human recombinant) injection 1 mg (has no administration in time range)   acetaminophen tablet 650 mg (has no administration in time range)   dextrose 10% bolus 125 mL 125 mL (has no administration in time range)   dextrose 10%  bolus 250 mL 250 mL (has no administration in time range)   0.9 % NaCl with KCl 20 mEq infusion ( Intravenous New Bag 1/4/24 0842)   oseltamivir capsule 75 mg (75 mg Oral Given 1/4/24 0838)   ocpbcrwkx-iflrvatd-pqurtie ala -25 mg (25 kg or greater) 1 tablet (1 tablet Oral Given 1/4/24 0838)   acetaminophen tablet 1,000 mg (1,000 mg Oral Given 1/3/24 2208)   lactated ringers bolus 1,000 mL (0 mLs Intravenous Stopped 1/4/24 0149)   calcium gluconate 100 mg/mL (10%) injection 1 g (1 g Intravenous Given 1/3/24 2301)   magnesium sulfate 2g in water 50mL IVPB (premix) (0 g Intravenous Stopped 1/4/24 0149)   potassium bicarbonate disintegrating tablet 50 mEq (50 mEq Oral Given 1/3/24 2300)   oseltamivir capsule 75 mg (75 mg Oral Given 1/4/24 0148)   potassium chloride 10 mEq in 100 mL IVPB (0 mEq Intravenous Stopped 1/4/24 0254)     Pt arrived alert, febrile, non-toxic in appearance, in no acute respiratory distress with tachycardia and remaining VSS. VS's improved following administration of acetaminophen and IVF's. IV and PO replenishment of electrolytes were ordered and consultation was then placed to hospital medicine for further evaluation and management.    Brayden Adame MD            Clinical Impression:  Final diagnoses:  [R00.0] Tachycardia  [E86.0] Dehydration  [J11.1] Influenza  [R19.7] Diarrhea, unspecified type  [E83.51] Hypocalcemia  [E83.42] Hypomagnesemia  [E87.6] Hypokalemia          ED Disposition Condition    Admit                 Brayden Adame MD  01/04/24 0840       Brayden Adame MD  01/04/24 0842

## 2024-01-05 ENCOUNTER — PES CALL (OUTPATIENT)
Dept: HOME HEALTH SERVICES | Facility: CLINIC | Age: 59
End: 2024-01-05
Payer: MEDICARE

## 2024-01-05 VITALS
HEART RATE: 79 BPM | SYSTOLIC BLOOD PRESSURE: 111 MMHG | BODY MASS INDEX: 20.92 KG/M2 | RESPIRATION RATE: 19 BRPM | WEIGHT: 122.56 LBS | DIASTOLIC BLOOD PRESSURE: 71 MMHG | OXYGEN SATURATION: 97 % | TEMPERATURE: 99 F | HEIGHT: 64 IN

## 2024-01-05 LAB
ALBUMIN SERPL BCP-MCNC: 2.2 G/DL (ref 3.5–5.2)
ALP SERPL-CCNC: 119 U/L (ref 55–135)
ALT SERPL W/O P-5'-P-CCNC: 49 U/L (ref 10–44)
ANION GAP SERPL CALC-SCNC: 9 MMOL/L (ref 8–16)
AST SERPL-CCNC: 42 U/L (ref 10–40)
BASOPHILS # BLD AUTO: 0.01 K/UL (ref 0–0.2)
BASOPHILS NFR BLD: 0.2 % (ref 0–1.9)
BILIRUB SERPL-MCNC: 0.5 MG/DL (ref 0.1–1)
BUN SERPL-MCNC: 4 MG/DL (ref 6–20)
CALCIUM SERPL-MCNC: 8.5 MG/DL (ref 8.7–10.5)
CD3+CD4+ CELLS # BLD: 743 CELLS/UL (ref 300–1400)
CD3+CD4+ CELLS NFR BLD: 53.8 % (ref 28–57)
CHLORIDE SERPL-SCNC: 101 MMOL/L (ref 95–110)
CO2 SERPL-SCNC: 21 MMOL/L (ref 23–29)
CREAT SERPL-MCNC: 0.7 MG/DL (ref 0.5–1.4)
DIFFERENTIAL METHOD BLD: ABNORMAL
EOSINOPHIL # BLD AUTO: 0 K/UL (ref 0–0.5)
EOSINOPHIL NFR BLD: 0.2 % (ref 0–8)
ERYTHROCYTE [DISTWIDTH] IN BLOOD BY AUTOMATED COUNT: 13.5 % (ref 11.5–14.5)
EST. GFR  (NO RACE VARIABLE): >60 ML/MIN/1.73 M^2
GLUCOSE SERPL-MCNC: 94 MG/DL (ref 70–110)
HCT VFR BLD AUTO: 36.5 % (ref 37–48.5)
HGB BLD-MCNC: 12 G/DL (ref 12–16)
IMM GRANULOCYTES # BLD AUTO: 0.02 K/UL (ref 0–0.04)
IMM GRANULOCYTES NFR BLD AUTO: 0.4 % (ref 0–0.5)
LYMPHOCYTES # BLD AUTO: 2.7 K/UL (ref 1–4.8)
LYMPHOCYTES NFR BLD: 59.4 % (ref 18–48)
MAGNESIUM SERPL-MCNC: 1.7 MG/DL (ref 1.6–2.6)
MCH RBC QN AUTO: 27.8 PG (ref 27–31)
MCHC RBC AUTO-ENTMCNC: 32.9 G/DL (ref 32–36)
MCV RBC AUTO: 85 FL (ref 82–98)
MONOCYTES # BLD AUTO: 0.3 K/UL (ref 0.3–1)
MONOCYTES NFR BLD: 5.5 % (ref 4–15)
NEUTROPHILS # BLD AUTO: 1.5 K/UL (ref 1.8–7.7)
NEUTROPHILS NFR BLD: 34.3 % (ref 38–73)
NRBC BLD-RTO: 0 /100 WBC
PHOSPHATE SERPL-MCNC: 3.2 MG/DL (ref 2.7–4.5)
PLATELET # BLD AUTO: 234 K/UL (ref 150–450)
PLATELET BLD QL SMEAR: ABNORMAL
PMV BLD AUTO: 9.9 FL (ref 9.2–12.9)
POTASSIUM SERPL-SCNC: 3.7 MMOL/L (ref 3.5–5.1)
PROT SERPL-MCNC: 7.4 G/DL (ref 6–8.4)
RBC # BLD AUTO: 4.32 M/UL (ref 4–5.4)
SODIUM SERPL-SCNC: 131 MMOL/L (ref 136–145)
WBC # BLD AUTO: 4.51 K/UL (ref 3.9–12.7)

## 2024-01-05 PROCEDURE — 85025 COMPLETE CBC W/AUTO DIFF WBC: CPT | Performed by: STUDENT IN AN ORGANIZED HEALTH CARE EDUCATION/TRAINING PROGRAM

## 2024-01-05 PROCEDURE — 83735 ASSAY OF MAGNESIUM: CPT | Performed by: STUDENT IN AN ORGANIZED HEALTH CARE EDUCATION/TRAINING PROGRAM

## 2024-01-05 PROCEDURE — 63600175 PHARM REV CODE 636 W HCPCS: Performed by: STUDENT IN AN ORGANIZED HEALTH CARE EDUCATION/TRAINING PROGRAM

## 2024-01-05 PROCEDURE — 80053 COMPREHEN METABOLIC PANEL: CPT | Performed by: STUDENT IN AN ORGANIZED HEALTH CARE EDUCATION/TRAINING PROGRAM

## 2024-01-05 PROCEDURE — 25000003 PHARM REV CODE 250: Performed by: STUDENT IN AN ORGANIZED HEALTH CARE EDUCATION/TRAINING PROGRAM

## 2024-01-05 PROCEDURE — 84100 ASSAY OF PHOSPHORUS: CPT | Performed by: STUDENT IN AN ORGANIZED HEALTH CARE EDUCATION/TRAINING PROGRAM

## 2024-01-05 PROCEDURE — 36415 COLL VENOUS BLD VENIPUNCTURE: CPT | Performed by: STUDENT IN AN ORGANIZED HEALTH CARE EDUCATION/TRAINING PROGRAM

## 2024-01-05 RX ORDER — AZITHROMYCIN 250 MG/1
TABLET, FILM COATED ORAL
Qty: 6 TABLET | Refills: 0 | Status: SHIPPED | OUTPATIENT
Start: 2024-01-05 | End: 2024-01-10

## 2024-01-05 RX ORDER — OSELTAMIVIR PHOSPHATE 75 MG/1
75 CAPSULE ORAL 2 TIMES DAILY
Qty: 10 CAPSULE | Refills: 0 | Status: SHIPPED | OUTPATIENT
Start: 2024-01-05 | End: 2024-01-10

## 2024-01-05 RX ORDER — MAGNESIUM SULFATE 1 G/100ML
1 INJECTION INTRAVENOUS ONCE
Status: COMPLETED | OUTPATIENT
Start: 2024-01-05 | End: 2024-01-05

## 2024-01-05 RX ADMIN — BICTEGRAVIR SODIUM, EMTRICITABINE, AND TENOFOVIR ALAFENAMIDE FUMARATE 1 TABLET: 50; 200; 25 TABLET ORAL at 09:01

## 2024-01-05 RX ADMIN — POTASSIUM CHLORIDE AND SODIUM CHLORIDE: 900; 150 INJECTION, SOLUTION INTRAVENOUS at 12:01

## 2024-01-05 RX ADMIN — MAGNESIUM SULFATE 1 G: 1 INJECTION INTRAVENOUS at 09:01

## 2024-01-05 RX ADMIN — OSELTAMIVIR PHOSPHATE 75 MG: 75 CAPSULE ORAL at 09:01

## 2024-01-05 NOTE — PLAN OF CARE
[facility] - Telemetry  Discharge Final Note    Primary Care Provider: [facility] -    Expected Discharge Date: 1/5/2024    Final Discharge Note (most recent)       Final Note - 01/05/24 0944          Final Note    Assessment Type Final Discharge Note     Anticipated Discharge Disposition Home or Self Care     What phone number can be called within the next 1-3 days to see how you are doing after discharge? [phone]     Hospital Resources/Appts/Education Provided Appointments scheduled and added to AVS        Post-Acute Status    Discharge Delays None known at this time                     Important Message from Medicare             Contact Info       [facility]    [address] [place] [address]   [phone]       Next Steps: Schedule an appointment as soon as possible for a visit in 1 week(s)          HELEN secure chat nurse Ray that patient cleared from case management standpoint.

## 2024-01-05 NOTE — PROGRESS NOTES
[facility]  Nurses Note -- 4 Eyes      1/4/2024       Skin assessed on: Q Shift      [x] No Pressure Injuries Present    [x]Prevention Measures Documented    [] Yes LDA  for Pressure Injury Previously documented     [] Yes New Pressure Injury Discovered   [] LDA for New Pressure Injury Added      Attending RN:  Deborah Albright, MARLENE     Second RN:   Janee Nguyen RN

## 2024-01-05 NOTE — PLAN OF CARE
Problem: Skin Injury Risk Increased  Goal: Skin Health and Integrity  Outcome: Ongoing, Progressing  Intervention: Optimize Skin Protection  Flowsheets (Taken 1/5/2024 0410)  Pressure Reduction Techniques:   frequent weight shift encouraged   weight shift assistance provided  Pressure Reduction Devices:   positioning supports utilized   pressure-redistributing mattress utilized  Head of Bed (HOB) Positioning: HOB at 30 degrees     Problem: Fall Injury Risk  Goal: Absence of Fall and Fall-Related Injury  Outcome: Ongoing, Progressing  Intervention: Identify and Manage Contributors  Flowsheets (Taken 1/5/2024 0410)  Self-Care Promotion:   BADL personal objects within reach   BADL personal routines maintained  Medication Review/Management: medications reviewed     Problem: Infection  Goal: Absence of Infection Signs and Symptoms  Outcome: Ongoing, Progressing  Intervention: Prevent or Manage Infection  Flowsheets (Taken 1/5/2024 0410)  Fever Reduction/Comfort Measures:   lightweight bedding   lightweight clothing  Infection Management: aseptic technique maintained  Isolation Precautions:   precautions maintained   droplet

## 2024-01-05 NOTE — NURSING
Tech reported that patient's temp was 101 at 1609.  Writer went in patient's room noted excessive covers on patient and room hot, adjustment was made, excessive covers removed and room temp changed.  Temp was reassessed and noted temp was 99.1.   Will continue to monitor for change in condition.

## 2024-01-07 NOTE — HOSPITAL COURSE
Ms. Smith is a 57 yo F with hx of HIV on biktarvy who presents to the ED for evaluation of fatigue, lethargy and fever. She reportedly woke up not feeling well and went to her PCP where she was diagnosed with influenza and started on tamiflu. She continued to feel worse with fevers and presented to the ED. In the ED, she was tachycardic to 120 and febrile to 103.5 saturating 100% on room air. Lab work remarkable for K 2.4, Ca 6.0 and Mg 1.0. She was given tylenol, magnesium, potassium and calcium. Admitted to hospital medicine for further management. Electrolytes replaced overnight with overall improvement in vital signs. No repeat fevers noted. Pt feeling better and asking to be discharged the following day with family at bedside. Pt d/c home to continue tamiflu and complete a zpac at discharge. Pt and family at bedside for discharge conversation with all questions answered and both endorsing understanding of the plan. Pt to f/u with pcp within a week

## 2024-01-07 NOTE — DISCHARGE SUMMARY
[facility] Medicine  Discharge Summary      Patient Name: Yris Smith  MRN: [id number]  [facility]: [id number]  Patient Class: IP- Inpatient  Admission Date: 1/3/2024  Hospital Length of Stay: 1 days  Discharge Date and Time: 1/5/2024  3:00 PM  Attending Physician: No att. providers found   Discharging Provider: Magnus Smith III, MD  Primary Care Provider: [facility] -    Primary Care Team: Networked reference to record PCT     HPI:   Ms. Smith is a 59 yo F with hx of HIV on biktarvy who presents to the ED for evaluation of fatigue, lethargy and fever. She reportedly woke up not feeling well and went to her PCP where she was diagnosed with influenza and started on tamiflu. She continued to feel worse with fevers and presented to the ED.   In the ED, she was tachycardic to 120 and febrile to 103.5 saturating 100% on room air. Lab work remarkable for K 2.4, Ca 6.0 and Mg 1.0. She was given tylenol, magnesium, potassium and calcium. Admitted to hospital medicine for further management.    * No surgery found *      Hospital Course:   Ms. Smith is a 59 yo F with hx of HIV on biktarvy who presents to the ED for evaluation of fatigue, lethargy and fever. She reportedly woke up not feeling well and went to her PCP where she was diagnosed with influenza and started on tamiflu. She continued to feel worse with fevers and presented to the ED. In the ED, she was tachycardic to 120 and febrile to 103.5 saturating 100% on room air. Lab work remarkable for K 2.4, Ca 6.0 and Mg 1.0. She was given tylenol, magnesium, potassium and calcium. Admitted to hospital medicine for further management. Electrolytes replaced overnight with overall improvement in vital signs. No repeat fevers noted. Pt feeling better and asking to be discharged the following day with family at bedside. Pt d/c home to continue tamiflu and complete a zpac at discharge. Pt and family at bedside for discharge conversation with all  questions answered and both endorsing understanding of the plan. Pt to f/u with pcp within a week       Goals of Care Treatment Preferences:  Code Status: Full Code      Consults:     No new Assessment & Plan notes have been filed under this hospital service since the last note was generated.  Service: Hospital Medicine    Final Active Diagnoses:    Diagnosis Date Noted POA    PRINCIPAL PROBLEM:  Hypokalemia [E87.6] 01/04/2024 Yes    Hypocalcemia [E83.51] 01/04/2024 Yes    Hypomagnesemia [E83.42] 01/04/2024 Yes    Influenza [J11.1] 01/03/2024 Yes    HIV disease [B20] 12/28/2023 Yes      Problems Resolved During this Admission:       Discharged Condition: fair    Disposition: Home or Self Care    Follow Up:   Follow-up Information       [facility]. Schedule an appointment as soon as possible for a visit in 1 week(s).    Contact information:  [facility]   [place], [place] [id number] [phone]                         Patient Instructions:      Ambulatory referral/consult to [facility]   Standing Status: Future   Referral Priority: Urgent Referral Type: Consultation   Referral Reason: Specialty Services Required   Number of Visits Requested: 1     Notify your health care provider if you experience any of the following:  increased confusion or weakness     Notify your health care provider if you experience any of the following:  persistent dizziness, light-headedness, or visual disturbances     Notify your health care provider if you experience any of the following:  worsening rash     Notify your health care provider if you experience any of the following:  severe persistent headache     Notify your health care provider if you experience any of the following:  difficulty breathing or increased cough     Notify your health care provider if you experience any of the following:  redness, tenderness, or signs of infection (pain, swelling, redness, odor or  green/yellow discharge around incision site)     Notify your health care provider if you experience any of the following:  severe uncontrolled pain     Notify your health care provider if you experience any of the following:  persistent nausea and vomiting or diarrhea     Notify your health care provider if you experience any of the following:  temperature >100.4     Activity as tolerated       Significant Diagnostic Studies: Labs: All labs within the past 24 hours have been reviewed    Pending Diagnostic Studies:       None           Medications:  Reconciled Home Medications:      Medication List        START taking these medications      azithromycin 250 MG tablet  Commonly known as: Z-RICHI  Take 2 tablets by mouth on day 1; Take 1 tablet by mouth on days 2-5     oseltamivir 75 MG capsule  Commonly known as: TAMIFLU  Take 1 capsule (75 mg total) by mouth 2 (two) times daily. for 5 days            CONTINUE taking these medications      BIKTARVY ORAL  Take by mouth.     doxycycline 100 MG EC tablet  Commonly known as: DORYX  Take 100 mg by mouth once daily.     ergocalciferol 50,000 unit Cap  Commonly known as: ERGOCALCIFEROL  Take 1 capsule by mouth every 7 days.            STOP taking these medications      naproxen 500 MG tablet  Commonly known as: NAPROSYN              Indwelling Lines/Drains at time of discharge:   Lines/Drains/Airways       None                   Time spent on the discharge of patient: 39 minutes         Magnus Smith III, MD  Department of Hospital Medicine  [facility]   Doxepin Pregnancy And Lactation Text: This medication is Pregnancy Category C and it isn't known if it is safe during pregnancy. It is also excreted in breast milk and breast feeding isn't recommended.

## 2024-01-12 NOTE — PHYSICIAN QUERY
PT Name: Yris Smith  MR #: [id number]    DOCUMENTATION CLARIFICATION     CDS/: SARABJIT Manriquez, RN, CDS               Contact information: [email or web address]     This form is a permanent document in the medical record.     Query Date: January 12, 2024      By submitting this query, we are merely seeking further clarification of documentation. Please utilize your independent clinical judgment when addressing the question(s) below.    The Medical Record contains the following:   Indicators Supporting Clinical Findings Location in Medical Record   X HIV or AIDS  PMHx including HIV (10/2023 CD4 500/24%, Vl <50, on [facility])      HIV disease--on [facility], reports compliance      Hx HIV on [facility]   HIV disease    ED, Dr. Adame, 1/3       H&P, Dr. Ruffin, 1/4     DC, Dr. Smith, III, 1/5   X CD4 Count          CD4%  (10/2023 CD4 500/24%, Vl <50, on [facility])      01/04/24   Absolute CD4 743   CD4 % [place] T Cell 53.8     ED, Dr. Adame, 1/3     Lab 1/4            History of or current Opportunistic Infection     X Acute/Chronic Illness  PMHx including HIV (10/2023 CD4 500/24%, Vl <50, on [facility])      Hypokalemia, hypomagnesemia, hypocalcemia, Influenza    H&P, Dr. Ruffin, 1/4    AIDS-Defining Condition or HIV-Related Illness documented     X Medication  -on [facility], reports compliance    Resume    H&P, Dr. Ruffin, 1/4    Other       The clinical guidelines noted are only a system guideline. It does not replace the providers clinical judgment.    The following are AIDS-Defining Conditions or HIV-Related Illnesses (noted in more recent literature as Opportunistic Illnesses in HIV Infection):   Candidiasis of bronchi, trachea, or lungs Lymphoma, Burkitt (or equivalent term)   Candidiasis of esophagus Lymphoma, immunoblastic (or equivalent term)   Cervical cancer, invasive only among adults, adolescents, and children aged > 6 years Lymphoma, primary, of brain   Coccidioidomycosis, disseminated or extrapulmonary  Mycobacterium avium complex or Mycobacterium kansasii, disseminated or extrapulmonary   Cryptococcosis, extrapulmonary Mycobacterium tuberculosis of any site, pulmonary (only among adults, adolescents, and children aged >6 years), disseminated, or extrapulmonary   Cryptosporidiosis, chronic intestinal (>1 months duration) Mycobacterium, other species or unidentified species, disseminated or extrapulmonary   Cytomegalovirus disease (other than liver, spleen, or nodes), onset at age >1 month Pneumocystitis jirovecii (previously known as Pneumocystis carinii) pneumonia   Cytomegalovirus retinitis (with loss of vision) Pneumonia, recurrent only among adults, adolescents, and children aged >6 years   Encephalopathy attributed to HIV Progressive multifocal leukoencephalopathy   Herpes simplex: chronic ulcers (>1 months  duration) or bronchitis, pneumonitis, or esophagitis (onset at age >1 month) Salmonella septicemia, recurrent   Histoplasmosis, disseminated or extrapulmonary Toxoplasmosis of brain, onset at age >1 month   Isosporiasis, chronic intestinal (>1 months duration) Wasting syndrome attributed to HIV   Kaposi sarcoma        Once a patient is diagnosed with HIV Disease or AIDS the diagnosis still stands even if, after treatment, the CD4+ T-lymphocyte count rises to above 200 µL or other AIDS-Defining Conditions or HIV-Related Illnesses are resolved.       Provider, please clarify the patient's HIV diagnosis.  [  x ] Asymptomatic HIV Infection - Positive Status Only without any history of or current AIDS-Defining Condition or HIV-Related Illness and without a history of or current CD4+ T-Lymphocyte count < 200 uL or total percentage of T-lymphocytes <14 (percentage is used only if the count is missing)   [   ] HIV Disease or AIDS, please check the applicable support for the diagnosis:    [   ] Patient has or had a history of CD4+ T-Lymphocyte count < 200 uL or total percentage of T-lymphocytes <14 (percentage  is used only if the count is missing)   [   ] Patient has or had a history of AIDS-Defining Condition or HIV-Related Illness (please specify if not previously documented): ______________   [   ] Patient has or had a history of CD4+ T-Lymphocyte count < 200 uL or total percentage of T-lymphocytes <14 (percentage is used only if the count is missing) and an AIDS-Defining Condition or HIV-Related Illness (please specify if not previously documented): ______________      [   ] Other (please specify):________     Please document in your progress notes daily for the duration of treatment until resolved and include in your discharge summary.    References:  Revised Surveillance Case Definition for HIV Infection - [place], 2014. (2014, April 11). Retrieved November 09, 2020, from https://www.cdc.gov/mmwr/preview/mmwrhtml/lt3654m7.htm?s_cid=[email or web address]_h  MATEO Mcintosh MD. (2019, April). Techniques and interpretation of measurement of the CD4 cell count in HIV-infected patients ([id number] FRANK Sweeney MD & [id number] NIRAJ Valdes MD, MPH, Eds.). Retrieved November 09, 2020, from [email or web address]-[email or web address]-vn-mgpdiqyqlak-es-the-cd4-cell-count-in-hiv-infected-patients?search=hiv%20disease%20cd4%20count&source=[email or web address]&selectedTitle=1~150&usage_type=default&display_rank=1#[id number]    Form 35430